# Patient Record
Sex: FEMALE | Race: WHITE | NOT HISPANIC OR LATINO | URBAN - METROPOLITAN AREA
[De-identification: names, ages, dates, MRNs, and addresses within clinical notes are randomized per-mention and may not be internally consistent; named-entity substitution may affect disease eponyms.]

---

## 2022-02-13 ENCOUNTER — HOSPITAL ENCOUNTER (EMERGENCY)
Facility: OTHER | Age: 22
Discharge: HOME | End: 2022-02-13
Attending: EMERGENCY MEDICINE
Payer: COMMERCIAL

## 2022-02-13 VITALS
BODY MASS INDEX: 22.49 KG/M2 | SYSTOLIC BLOOD PRESSURE: 130 MMHG | WEIGHT: 135 LBS | TEMPERATURE: 98 F | HEIGHT: 65 IN | RESPIRATION RATE: 18 BRPM | DIASTOLIC BLOOD PRESSURE: 86 MMHG | HEART RATE: 87 BPM | OXYGEN SATURATION: 100 %

## 2022-02-13 DIAGNOSIS — T19.2XXA FOREIGN BODY IN VAGINA, INITIAL ENCOUNTER: Primary | ICD-10-CM

## 2022-02-13 DIAGNOSIS — N76.0 BV (BACTERIAL VAGINOSIS): ICD-10-CM

## 2022-02-13 DIAGNOSIS — B96.89 BV (BACTERIAL VAGINOSIS): ICD-10-CM

## 2022-02-13 LAB
ALBUMIN SERPL BCP-MCNC: 3.9 G/DL (ref 3.5–5.2)
ALP SERPL-CCNC: 50 U/L (ref 55–135)
ALT SERPL W/O P-5'-P-CCNC: 15 U/L (ref 10–44)
ANION GAP SERPL CALC-SCNC: 9 MMOL/L (ref 8–16)
AST SERPL-CCNC: 23 U/L (ref 10–40)
B-HCG UR QL: NEGATIVE
BACTERIA #/AREA URNS HPF: ABNORMAL /HPF
BACTERIA GENITAL QL WET PREP: ABNORMAL
BASOPHILS # BLD AUTO: 0.04 K/UL (ref 0–0.2)
BASOPHILS NFR BLD: 0.4 % (ref 0–1.9)
BILIRUB SERPL-MCNC: 0.5 MG/DL (ref 0.1–1)
BILIRUB UR QL STRIP: NEGATIVE
BUN SERPL-MCNC: 13 MG/DL (ref 6–20)
CALCIUM SERPL-MCNC: 9.1 MG/DL (ref 8.7–10.5)
CHLORIDE SERPL-SCNC: 109 MMOL/L (ref 95–110)
CLARITY UR: CLEAR
CLUE CELLS VAG QL WET PREP: ABNORMAL
CO2 SERPL-SCNC: 21 MMOL/L (ref 23–29)
COLOR UR: YELLOW
CREAT SERPL-MCNC: 0.8 MG/DL (ref 0.5–1.4)
CTP QC/QA: YES
DIFFERENTIAL METHOD: ABNORMAL
EOSINOPHIL # BLD AUTO: 0.1 K/UL (ref 0–0.5)
EOSINOPHIL NFR BLD: 0.7 % (ref 0–8)
ERYTHROCYTE [DISTWIDTH] IN BLOOD BY AUTOMATED COUNT: 11.8 % (ref 11.5–14.5)
EST. GFR  (AFRICAN AMERICAN): >60 ML/MIN/1.73 M^2
EST. GFR  (NON AFRICAN AMERICAN): >60 ML/MIN/1.73 M^2
FILAMENT FUNGI VAG WET PREP-#/AREA: ABNORMAL
GLUCOSE SERPL-MCNC: 90 MG/DL (ref 70–110)
GLUCOSE UR QL STRIP: NEGATIVE
HCT VFR BLD AUTO: 37.4 % (ref 37–48.5)
HGB BLD-MCNC: 12.4 G/DL (ref 12–16)
HGB UR QL STRIP: ABNORMAL
IMM GRANULOCYTES # BLD AUTO: 0.03 K/UL (ref 0–0.04)
IMM GRANULOCYTES NFR BLD AUTO: 0.3 % (ref 0–0.5)
KETONES UR QL STRIP: NEGATIVE
LEUKOCYTE ESTERASE UR QL STRIP: ABNORMAL
LYMPHOCYTES # BLD AUTO: 1.6 K/UL (ref 1–4.8)
LYMPHOCYTES NFR BLD: 13.8 % (ref 18–48)
MCH RBC QN AUTO: 30.4 PG (ref 27–31)
MCHC RBC AUTO-ENTMCNC: 33.2 G/DL (ref 32–36)
MCV RBC AUTO: 92 FL (ref 82–98)
MICROSCOPIC COMMENT: ABNORMAL
MONOCYTES # BLD AUTO: 0.8 K/UL (ref 0.3–1)
MONOCYTES NFR BLD: 7.1 % (ref 4–15)
NEUTROPHILS # BLD AUTO: 8.8 K/UL (ref 1.8–7.7)
NEUTROPHILS NFR BLD: 77.7 % (ref 38–73)
NITRITE UR QL STRIP: NEGATIVE
NRBC BLD-RTO: 0 /100 WBC
PH UR STRIP: 6 [PH] (ref 5–8)
PLATELET # BLD AUTO: 358 K/UL (ref 150–450)
PMV BLD AUTO: 9.1 FL (ref 9.2–12.9)
POTASSIUM SERPL-SCNC: 3.3 MMOL/L (ref 3.5–5.1)
PROT SERPL-MCNC: 6.6 G/DL (ref 6–8.4)
PROT UR QL STRIP: NEGATIVE
RBC # BLD AUTO: 4.08 M/UL (ref 4–5.4)
RBC #/AREA URNS HPF: 12 /HPF (ref 0–4)
SODIUM SERPL-SCNC: 139 MMOL/L (ref 136–145)
SP GR UR STRIP: 1.02 (ref 1–1.03)
SPECIMEN SOURCE: ABNORMAL
T VAGINALIS GENITAL QL WET PREP: ABNORMAL
URN SPEC COLLECT METH UR: ABNORMAL
UROBILINOGEN UR STRIP-ACNC: NEGATIVE EU/DL
WBC # BLD AUTO: 11.35 K/UL (ref 3.9–12.7)
WBC #/AREA URNS HPF: 2 /HPF (ref 0–5)
WBC #/AREA VAG WET PREP: ABNORMAL
YEAST GENITAL QL WET PREP: ABNORMAL

## 2022-02-13 PROCEDURE — 80053 COMPREHEN METABOLIC PANEL: CPT | Performed by: PHYSICIAN ASSISTANT

## 2022-02-13 PROCEDURE — 87591 N.GONORRHOEAE DNA AMP PROB: CPT | Performed by: PHYSICIAN ASSISTANT

## 2022-02-13 PROCEDURE — 81000 URINALYSIS NONAUTO W/SCOPE: CPT | Performed by: PHYSICIAN ASSISTANT

## 2022-02-13 PROCEDURE — 81025 URINE PREGNANCY TEST: CPT | Performed by: PHYSICIAN ASSISTANT

## 2022-02-13 PROCEDURE — 99284 EMERGENCY DEPT VISIT MOD MDM: CPT | Mod: 25

## 2022-02-13 PROCEDURE — 85025 COMPLETE CBC W/AUTO DIFF WBC: CPT | Performed by: PHYSICIAN ASSISTANT

## 2022-02-13 PROCEDURE — 87491 CHLMYD TRACH DNA AMP PROBE: CPT | Performed by: PHYSICIAN ASSISTANT

## 2022-02-13 PROCEDURE — 87210 SMEAR WET MOUNT SALINE/INK: CPT | Performed by: PHYSICIAN ASSISTANT

## 2022-02-13 RX ORDER — CLINDAMYCIN PHOSPHATE 20 MG/G
CREAM VAGINAL NIGHTLY
Qty: 40 G | Refills: 0 | Status: SHIPPED | OUTPATIENT
Start: 2022-02-13 | End: 2022-02-20

## 2022-02-13 NOTE — Clinical Note
"Erica Vogelia" ConstantinTeMoon was seen and treated in our emergency department on 2/13/2022.  She may return to school on 02/14/2022.      If you have any questions or concerns, please don't hesitate to call.      ANNI Alfonso"

## 2022-02-14 NOTE — ED NOTES
Pt states on Friday night she inserted a tampon, along with another behind it. Went out with some friends that night. Changed the 2nd tampon as usual that night and Saturday. Realized today that the first tampon was still inserted, and cannot pull it out. Pt states she has cold symptoms.  
1 session: Ambulate 250' without assistive device independently.

## 2022-02-14 NOTE — ED PROVIDER NOTES
Encounter Date: 2/13/2022       History     Chief Complaint   Patient presents with    Foreign Body in Vagina     Pt states tampon stuck in vagina since Friday night     Afebrile 21-year-old female presents the ED for evaluation of retained foreign body in the vagina.  Patient states that she is currently menstruating.  She states she has had a tampon in since Friday night.  She states that she has attempted to remove however she cannot extrapolate.  She denies any vaginal discharge, abdominal pain or pelvic pain.  She denies any STI exposure.  Patient does report that she felt like she was coming down with cold as she has noted rhinorrhea nasal congestion today.  She denies any fever chills nausea, vomiting or additional complaints.  She has not tried any medications for symptoms.  Denies any history of immunocompromise state.    The history is provided by the patient.     Review of patient's allergies indicates:  No Known Allergies  No past medical history on file.  No past surgical history on file.  No family history on file.     Review of Systems   Constitutional: Negative for activity change, appetite change, chills and fever.   HENT: Positive for rhinorrhea. Negative for sore throat.    Respiratory: Negative for cough and shortness of breath.    Cardiovascular: Negative for chest pain.   Gastrointestinal: Negative for nausea and vomiting.   Genitourinary: Positive for vaginal bleeding and vaginal pain. Negative for dysuria, flank pain, hematuria, pelvic pain and vaginal discharge.   Musculoskeletal: Negative for arthralgias, back pain and myalgias.   Skin: Positive for rash.   Allergic/Immunologic: Negative for immunocompromised state.   Neurological: Negative for weakness and headaches.   Hematological: Does not bruise/bleed easily.       Physical Exam     Initial Vitals [02/13/22 2009]   BP Pulse Resp Temp SpO2   (!) 153/92 105 20 98.5 °F (36.9 °C) 100 %      MAP       --         Physical Exam    Nursing  note and vitals reviewed.  Constitutional: Vital signs are normal. She appears well-developed and well-nourished. She is cooperative.  Non-toxic appearance. She does not appear ill. No distress.   HENT:   Head: Normocephalic and atraumatic.   Eyes: Conjunctivae and lids are normal.   Neck: Neck supple.   Cardiovascular: Normal rate and regular rhythm.   Pulmonary/Chest: Breath sounds normal. No respiratory distress. She has no wheezes. She has no rhonchi.   Abdominal: Abdomen is soft. Normal appearance. There is no abdominal tenderness. There is no rigidity and no guarding.   Genitourinary:    Pelvic exam was performed with patient supine.   Cervix exhibits no motion tenderness, no discharge and no friability. Right adnexum displays no mass and no tenderness. Left adnexum displays no mass and no tenderness.    Vaginal discharge present.      No vaginal tenderness.   No tenderness in the vagina.    There is a foreign body in the vagina.      Genitourinary Comments: Tampon visualized on speculum exam.  Removed with ring forceps.  Slight blood in the vaginal vault.  No significant discharge.  No cervical motion tenderness.  No uterine tenderness or adnexal tenderness.     Musculoskeletal:         General: Normal range of motion.      Cervical back: Neck supple. No rigidity.     Neurological: She is alert and oriented to person, place, and time. She has normal strength. GCS score is 15. GCS eye subscore is 4. GCS verbal subscore is 5. GCS motor subscore is 6.   Skin: Skin is warm, dry and intact. Rash noted.   Psychiatric: She has a normal mood and affect. Her speech is normal and behavior is normal. Thought content normal.         ED Course   Foreign Body    Date/Time: 2/13/2022 8:47 PM  Performed by: ANNI Alfonso  Authorized by: Mary Kumar MD   Consent Done: Yes  Consent: Verbal consent obtained.  Consent given by: patient  Body area: vagina    Patient sedated: no  Patient restrained: no  Patient  cooperative: yes  Localization method: speculum.  Removal mechanism: ring forceps  Complexity: simple  1 objects recovered.  Objects recovered: tampon  Post-procedure assessment: foreign body removed  Patient tolerance: Patient tolerated the procedure well with no immediate complications      Labs Reviewed   C. TRACHOMATIS/N. GONORRHOEAE BY AMP DNA - Abnormal; Notable for the following components:       Result Value    Chlamydia, Amplified DNA Detected (*)     All other components within normal limits    Narrative:     Sources by Resulting Lab:->[facility]  Release to patient->Immediate   VAGINAL SCREEN - Abnormal; Notable for the following components:    Clue Cells Few (*)     WBC - Vaginal Screen Rare (*)     Bacteria - Vaginal Screen Many (*)     All other components within normal limits    Narrative:     Release to patient->Immediate   CBC W/ AUTO DIFFERENTIAL - Abnormal; Notable for the following components:    MPV 9.1 (*)     Gran # (ANC) 8.8 (*)     Gran % 77.7 (*)     Lymph % 13.8 (*)     All other components within normal limits   COMPREHENSIVE METABOLIC PANEL - Abnormal; Notable for the following components:    Potassium 3.3 (*)     CO2 21 (*)     Alkaline Phosphatase 50 (*)     All other components within normal limits   URINALYSIS, REFLEX TO URINE CULTURE - Abnormal; Notable for the following components:    Occult Blood UA 1+ (*)     Leukocytes, UA Trace (*)     All other components within normal limits    Narrative:     Specimen Source->Urine   URINALYSIS MICROSCOPIC - Abnormal; Notable for the following components:    RBC, UA 12 (*)     Bacteria Few (*)     All other components within normal limits    Narrative:     Specimen Source->Urine   POCT URINE PREGNANCY          Imaging Results    None          Medications - No data to display  Medical Decision Making:   History:   Old Medical Records: I decided to obtain old medical records.  Initial Assessment:   Emergent evaluation of typically will female  presenting to the ED for retained foreign body in vagina.  Patient states that she has had temp 1 since Friday.  She reports attempted removal without success.  She reports scant vaginal bleeding however denies any vaginal discharge.  She denies any pelvic pain, fever or chills.  She does report some rhinorrhea nasal congestion.  She has not tried any medications for symptoms.  She appears in no distress.  Physical exam reveals anxious appearing patient however nontoxic.  Noted flushing to the chest and upper back.  Spares the palms soles in other areas body.   exam with chaperone reveals retained tampon in the vaginal vault.  Removed with no difficulty.  Scant bleeding noted in the vaginal canal.  No significant discharge.  No cervical motion tenderness, adnexal tenderness or uterine tenderness.  Abdomen is soft and nontender with no guarding, rebound or rigidity.  Remaining exam grossly unremarkable.  Differential Diagnosis:   Differential Diagnosis includes, but is not limited to:  STI, HSV, BV, PID, TOA, vaginal foreign body, vaginal trauma, ovarian torsion, ovarian cyst, UTI/pyelonephritis, pregnancy complication, dysmenorrhea, mittelschmertz, appendicitis, nephrolithiasis, appendicitis, colitis, diverticulitis,    Clinical Tests:   Lab Tests: Reviewed and Ordered  ED Management:  Plan for formal removal.  This was completed with no difficulty.  Initially patient had noted macular rash to chest.  Did discuss that appears consistent with flushing however given retained body wall without some basic labs.  Patient continues to remain nontoxic appearing in no distress and quite nervous.  After removal of foreign body this rash did appear to lightening.  She to agrees it was likely related to nervousness.  Labs are reassuring.  Wet prep does reveal clue cells.  Will start on intravaginal clindamycin as patient wanted to avoid Flagyl due to have it later this week where she knew she would be drinking.  Did discuss  we will send STI screening and will notify with abnormal results. Strict instructions to follow up with primary care physician or reference provided for further assessment and evaluation. Given instructions to return for any acute symptoms and verbalized understanding of this medical plan.                          Clinical Impression:   Final diagnoses:  [T19.2XXA] Foreign body in vagina, initial encounter (Primary)  [N76.0, B96.89] BV (bacterial vaginosis)          ED Disposition Condition    Discharge Stable        ED Prescriptions     Medication Sig Dispense Start Date End Date Auth. Provider    clindamycin (CLINDESSE) 2 % vaginal cream Place vaginally every evening. for 7 days 40 g 2/13/2022 2/20/2022 ANNI Alfonso        Follow-up Information     Follow up With Specialties Details Why Contact Info Additional Information    [facility] Schedule an appointment as soon as possible for a visit   [address]  [facility] [id number] [phone] [facility], 1st Floor Please park in [place]    [facility] Emergency Medicine  If symptoms worsen [address] [facility] [id number] [phone]            ANNI Alfonso  02/15/22 4504

## 2022-02-15 ENCOUNTER — TELEPHONE (OUTPATIENT)
Dept: EMERGENCY MEDICINE | Facility: OTHER | Age: 22
End: 2022-02-15
Payer: COMMERCIAL

## 2022-02-15 LAB
C TRACH DNA SPEC QL NAA+PROBE: DETECTED
N GONORRHOEA DNA SPEC QL NAA+PROBE: NOT DETECTED

## 2022-02-15 RX ORDER — DOXYCYCLINE 100 MG/1
100 CAPSULE ORAL 2 TIMES DAILY
Qty: 14 CAPSULE | Refills: 0 | Status: SHIPPED | OUTPATIENT
Start: 2022-02-15 | End: 2022-02-22